# Patient Record
Sex: MALE | Race: WHITE | NOT HISPANIC OR LATINO | Employment: OTHER | URBAN - NONMETROPOLITAN AREA
[De-identification: names, ages, dates, MRNs, and addresses within clinical notes are randomized per-mention and may not be internally consistent; named-entity substitution may affect disease eponyms.]

---

## 2024-05-23 ENCOUNTER — HOSPITAL ENCOUNTER (EMERGENCY)
Facility: HOSPITAL | Age: 54
Discharge: HOME | End: 2024-05-23
Attending: EMERGENCY MEDICINE
Payer: COMMERCIAL

## 2024-05-23 ENCOUNTER — OFFICE VISIT (OUTPATIENT)
Dept: URGENT CARE | Facility: CLINIC | Age: 54
End: 2024-05-23
Payer: COMMERCIAL

## 2024-05-23 VITALS
HEIGHT: 72 IN | OXYGEN SATURATION: 97 % | HEART RATE: 80 BPM | SYSTOLIC BLOOD PRESSURE: 125 MMHG | WEIGHT: 182 LBS | RESPIRATION RATE: 18 BRPM | DIASTOLIC BLOOD PRESSURE: 83 MMHG | BODY MASS INDEX: 24.65 KG/M2 | TEMPERATURE: 97.6 F

## 2024-05-23 VITALS
OXYGEN SATURATION: 98 % | SYSTOLIC BLOOD PRESSURE: 137 MMHG | BODY MASS INDEX: 24.65 KG/M2 | HEART RATE: 82 BPM | HEIGHT: 72 IN | WEIGHT: 182 LBS | TEMPERATURE: 97 F | DIASTOLIC BLOOD PRESSURE: 89 MMHG | RESPIRATION RATE: 16 BRPM

## 2024-05-23 DIAGNOSIS — S81.812A LACERATION OF LEFT LOWER EXTREMITY, INITIAL ENCOUNTER: Primary | ICD-10-CM

## 2024-05-23 DIAGNOSIS — S81.812A LEG LACERATION, LEFT, INITIAL ENCOUNTER: Primary | ICD-10-CM

## 2024-05-23 PROCEDURE — 99282 EMERGENCY DEPT VISIT SF MDM: CPT

## 2024-05-23 PROCEDURE — 12004 RPR S/N/AX/GEN/TRK7.6-12.5CM: CPT | Performed by: EMERGENCY MEDICINE

## 2024-05-23 PROCEDURE — 90715 TDAP VACCINE 7 YRS/> IM: CPT | Performed by: EMERGENCY MEDICINE

## 2024-05-23 PROCEDURE — 99203 OFFICE O/P NEW LOW 30 MIN: CPT | Performed by: PHYSICIAN ASSISTANT

## 2024-05-23 PROCEDURE — 99284 EMERGENCY DEPT VISIT MOD MDM: CPT | Performed by: EMERGENCY MEDICINE

## 2024-05-23 PROCEDURE — 90471 IMMUNIZATION ADMIN: CPT

## 2024-05-23 RX ORDER — CALCIPOTRIENE AND BETAMETHASONE DIPROPIONATE 50; .5 UG/G; MG/G
1 SUSPENSION TOPICAL DAILY
COMMUNITY
Start: 2024-03-20

## 2024-05-23 RX ORDER — CLOBETASOL PROPIONATE 0.5 MG/G
CREAM TOPICAL
COMMUNITY
Start: 2023-12-18

## 2024-05-23 RX ORDER — ZINC GLUCONATE 100 MG
1 TABLET ORAL DAILY
COMMUNITY

## 2024-05-23 RX ORDER — FLUOCINONIDE TOPICAL SOLUTION USP, 0.05% 0.5 MG/ML
1 SOLUTION TOPICAL 2 TIMES DAILY
COMMUNITY
Start: 2024-03-20

## 2024-05-23 RX ADMIN — TETANUS TOXOID, REDUCED DIPHTHERIA TOXOID AND ACELLULAR PERTUSSIS VACCINE, ADSORBED 0.5 ML: 5; 2.5; 8; 8; 2.5 SUSPENSION INTRAMUSCULAR at 13:23

## 2024-05-23 NOTE — ED PROVIDER NOTES
History  Chief Complaint   Patient presents with    Extremity Laceration     4x4 cut left leg, sent from  due to laceration down to bone. Last tetanus unknown.      Patient suffered laceration to left lower leg.  He was holding a 4 x 4 which fell and cut him.  He sustained a flap type laceration overlying his left shin.  He was evaluated at the urgent care in [place] and was sent to the emergency room for repair.      History provided by:  Patient   used: No    Laceration  Location:  Leg  Leg laceration location:  L lower leg  Depth:  Through underlying tissue  Quality: avulsion    Quality comment:  Semicircular type flap  Bleeding: controlled    Laceration mechanism:  Blunt object  Pain details:     Quality:  Aching    Severity:  Mild    Timing:  Constant    Progression:  Unchanged  Foreign body present:  No foreign bodies  Relieved by:  Nothing  Worsened by:  Nothing  Ineffective treatments:  None tried  Tetanus status:  Unknown  Associated symptoms: no fever and no rash        Prior to Admission Medications   Prescriptions Last Dose Informant Patient Reported? Taking?   Zinc Gluconate 100 MG TABS   Yes No   Sig: Take 1 tablet by mouth daily   calcipotriene-betamethasone (TACLONEX SCALP) external suspension   Yes No   Sig: Apply 1 application. topically daily   clobetasol (TEMOVATE) 0.05 % cream   Yes No   Sig: APPLY TOPICALLY TO AFFECTED AREAS TWICE A DAY   fluocinonide (LIDEX) 0.05 % external solution   Yes No   Sig: Apply 1 application. topically 2 (two) times a day      Facility-Administered Medications: None       No past medical history on file.    No past surgical history on file.    No family history on file.  I have reviewed and agree with the history as documented.    E-Cigarette/Vaping     E-Cigarette/Vaping Substances     Social History     Tobacco Use    Smoking status: Never    Smokeless tobacco: Never       Review of Systems   Constitutional:  Negative for chills and fever.    HENT:  Negative for ear pain, hearing loss, sore throat, trouble swallowing and voice change.    Eyes:  Negative for pain and discharge.   Respiratory:  Negative for cough, shortness of breath and wheezing.    Cardiovascular:  Negative for chest pain and palpitations.   Gastrointestinal:  Negative for abdominal pain, blood in stool, constipation, diarrhea, nausea and vomiting.   Genitourinary:  Negative for dysuria, flank pain, frequency and hematuria.   Musculoskeletal:  Negative for joint swelling, neck pain and neck stiffness.   Skin:  Negative for rash and wound.   Neurological:  Negative for dizziness, seizures, syncope, facial asymmetry and headaches.   Psychiatric/Behavioral:  Negative for hallucinations, self-injury and suicidal ideas.    All other systems reviewed and are negative.      Physical Exam  Physical Exam  Vitals and nursing note reviewed.   Constitutional:       General: He is not in acute distress.     Appearance: Normal appearance. He is well-developed. He is not ill-appearing or diaphoretic.   HENT:      Head: Normocephalic and atraumatic.      Right Ear: External ear normal.      Left Ear: External ear normal.   Eyes:      General: No scleral icterus.        Right eye: No discharge.         Left eye: No discharge.      Extraocular Movements: Extraocular movements intact.      Conjunctiva/sclera: Conjunctivae normal.   Pulmonary:      Effort: Pulmonary effort is normal. No respiratory distress.   Musculoskeletal:         General: No swelling or deformity. Normal range of motion.      Cervical back: Normal range of motion and neck supple.   Skin:     General: Skin is dry.      Coloration: Skin is not jaundiced or pale.      Findings: No rash.      Comments: Flap laceration left shin approximately 8 cm, no active bleeding   Neurological:      General: No focal deficit present.      Mental Status: He is alert and oriented to person, place, and time.      Cranial Nerves: No cranial nerve  deficit.      Motor: No weakness.      Coordination: Coordination normal.      Gait: Gait normal.   Psychiatric:         Mood and Affect: Mood normal.         Behavior: Behavior normal.         Thought Content: Thought content normal.         Judgment: Judgment normal.         Vital Signs  ED Triage Vitals [05/23/24 1315]   Temperature Pulse Respirations Blood Pressure SpO2   (!) 97 °F (36.1 °C) 82 16 137/89 98 %      Temp Source Heart Rate Source Patient Position - Orthostatic VS BP Location FiO2 (%)   Temporal Monitor Lying Right arm --      Pain Score       --           Vitals:    05/23/24 1315   BP: 137/89   Pulse: 82   Patient Position - Orthostatic VS: Lying         Visual Acuity      ED Medications  Medications   tetanus-diphtheria-acellular pertussis (BOOSTRIX) IM injection 0.5 mL (0.5 mL Intramuscular Given 5/23/24 1323)       Diagnostic Studies  Results Reviewed       None                   No orders to display              Procedures  Universal Protocol:  Consent: Verbal consent obtained. Written consent not obtained.  Consent given by: patient  Patient identity confirmed: verbally with patient and arm band  Laceration repair    Date/Time: 5/23/2024 1:20 PM    Performed by: Ervin Soto MD  Authorized by: Ervin Soto MD  Body area: lower extremity  Location details: left lower leg  Laceration length: 8 cm  Tendon involvement: none  Nerve involvement: none  Vascular damage: no  Anesthesia: local infiltration    Anesthesia:  Local Anesthetic: lidocaine 1% with epinephrine      Procedure Details:  Preparation: Patient was prepped and draped in the usual sterile fashion.  Irrigation solution: saline  Irrigation method: syringe  Amount of cleaning: standard  Wound skin closure material used: 4-0 ethilon.  Technique: simple  Approximation: close  Approximation difficulty: simple  Dressing: 4x4 sterile gauze  Patient tolerance: patient tolerated the procedure well with no immediate  complications               ED Course                                             Medical Decision Making  Based on the history and medical screening exam performed the diagnostic considerations include but are not limited to laceration.    Based on the work-up performed in the emergency room which includes physical examination, and which may include laboratory studies and imaging as warranted including advanced imaging such as CT scan or ultrasound, the diagnostic considerations are narrowed to exclude limb or life-threatening process.    The patient is stable for discharge.  Tetanus updated, laceration repaired, patient tolerated well.  Appropriate for discharge    Risk  Prescription drug management.             Disposition  Final diagnoses:   Laceration of left lower extremity, initial encounter     Time reflects when diagnosis was documented in both MDM as applicable and the Disposition within this note       Time User Action Codes Description Comment    5/23/2024  2:07 PM Ervin Soto Add [S81.812A] Laceration of left lower extremity, initial encounter           ED Disposition       ED Disposition   Discharge    Condition   Stable    Date/Time   u May 23, 2024  2:06 PM    Comment   Andrea Martinez discharge to home/self care.                   Follow-up Information       Follow up With Specialties Details Why Contact Info    Ryan Horton DO Family Medicine   [address] [place] [id number] [phone]              Patient's Medications   Discharge Prescriptions    No medications on file       No discharge procedures on file.    PDMP Review       None            ED Provider  Electronically Signed by             Ervin Soto MD  05/23/24 4949

## 2024-05-23 NOTE — PROGRESS NOTES
[facility]        NAME: Andrea Martinez is a 54 y.o. male  : 1970    MRN: [id number]  DATE: May 23, 2024  TIME: 1:02 PM    /83   Pulse 80   Temp 97.6 °F (36.4 °C)   Resp 18   Ht 6' (1.829 m)   Wt 82.6 kg (182 lb)   SpO2 97%   BMI 24.68 kg/m²     Assessment and Plan   Leg laceration, left, initial encounter [S81.812A]  1. Leg laceration, left, initial encounter              Patient Instructions       Follow up with PCP in 3-5 days.  Proceed to  ER if symptoms worsen.    Chief Complaint     Chief Complaint   Patient presents with    Extremity Laceration     Left leg laceration- down to bone           History of Present Illness       Pt with left lower leg laceration from piece of wood falling and hitting leg         Review of Systems   Review of Systems   Constitutional: Negative.    HENT: Negative.     Eyes: Negative.    Respiratory: Negative.     Cardiovascular: Negative.    Gastrointestinal: Negative.    Endocrine: Negative.    Genitourinary: Negative.    Musculoskeletal: Negative.    Skin: Negative.    Allergic/Immunologic: Negative.    Neurological: Negative.    Hematological: Negative.    Psychiatric/Behavioral: Negative.     All other systems reviewed and are negative.        Current Medications       Current Outpatient Medications:     calcipotriene-betamethasone (TACLONEX SCALP) external suspension, Apply 1 application. topically daily, Disp: , Rfl:     clobetasol (TEMOVATE) 0.05 % cream, APPLY TOPICALLY TO AFFECTED AREAS TWICE A DAY, Disp: , Rfl:     fluocinonide (LIDEX) 0.05 % external solution, Apply 1 application. topically 2 (two) times a day, Disp: , Rfl:     Zinc Gluconate 100 MG TABS, Take 1 tablet by mouth daily, Disp: , Rfl:     Current Allergies     Allergies as of 2024 - Reviewed 2024   Allergen Reaction Noted    Erythromycin Hives 2015            The following portions of the patient's history were reviewed and updated as appropriate: allergies,  current medications, past family history, past medical history, past social history, past surgical history and problem list.     History reviewed. No pertinent past medical history.    History reviewed. No pertinent surgical history.    History reviewed. No pertinent family history.      Medications have been verified.        Objective   /83   Pulse 80   Temp 97.6 °F (36.4 °C)   Resp 18   Ht 6' (1.829 m)   Wt 82.6 kg (182 lb)   SpO2 97%   BMI 24.68 kg/m²        Physical Exam     Physical Exam  Vitals and nursing note reviewed.   Constitutional:       Appearance: Normal appearance. He is normal weight.      Comments: With flap  laceration pulled back, attachment area is distal (ankle side of vertical laceration)  will send to er for possible surgical consult because of high tension on skin flap required to close and long flap with small attachment area on distal side of flap    HENT:      Head: Normocephalic and atraumatic.   Cardiovascular:      Rate and Rhythm: Normal rate and regular rhythm.      Pulses: Normal pulses.      Heart sounds: Normal heart sounds.   Pulmonary:      Effort: Pulmonary effort is normal.      Breath sounds: Normal breath sounds.   Abdominal:      Palpations: Abdomen is soft.   Musculoskeletal:         General: Normal range of motion.      Cervical back: Normal range of motion and neck supple.      Comments: Left anterior lower leg  9cm x 3cm flap type laceration with small attachment area , + bone exposure  distal neuro and vascular wnl    Skin:     General: Skin is warm.   Neurological:      Mental Status: He is alert and oriented to person, place, and time.

## 2025-06-21 ENCOUNTER — OFFICE VISIT (OUTPATIENT)
Dept: URGENT CARE | Facility: CLINIC | Age: 55
End: 2025-06-21
Payer: COMMERCIAL

## 2025-06-21 VITALS
RESPIRATION RATE: 20 BRPM | HEART RATE: 98 BPM | SYSTOLIC BLOOD PRESSURE: 130 MMHG | TEMPERATURE: 97.4 F | WEIGHT: 175 LBS | OXYGEN SATURATION: 99 % | DIASTOLIC BLOOD PRESSURE: 93 MMHG | BODY MASS INDEX: 23.7 KG/M2 | HEIGHT: 72 IN

## 2025-06-21 DIAGNOSIS — L25.9 CONTACT DERMATITIS, UNSPECIFIED CONTACT DERMATITIS TYPE, UNSPECIFIED TRIGGER: Primary | ICD-10-CM

## 2025-06-21 PROCEDURE — G0382 LEV 3 HOSP TYPE B ED VISIT: HCPCS

## 2025-06-21 PROCEDURE — S9083 URGENT CARE CENTER GLOBAL: HCPCS

## 2025-06-21 RX ORDER — PANTOPRAZOLE SODIUM 20 MG/1
20 TABLET, DELAYED RELEASE ORAL DAILY
COMMUNITY

## 2025-06-21 RX ORDER — PREDNISONE 10 MG/1
TABLET ORAL
Qty: 21 TABLET | Refills: 0 | Status: SHIPPED | OUTPATIENT
Start: 2025-06-21

## 2025-06-21 RX ORDER — TRIAMCINOLONE ACETONIDE 1 MG/G
CREAM TOPICAL 2 TIMES DAILY
Qty: 28.4 G | Refills: 0 | Status: SHIPPED | OUTPATIENT
Start: 2025-06-21

## 2025-06-21 NOTE — PATIENT INSTRUCTIONS
Take course of steroids as prescribed. Be sure to take with food! Recommended to take in the morning, as taking this medication later in the day may cause sleep disturbance (keeping you awake). If diabetic, be sure to monitor your blood glucose levels while on this medication and notify PCP if levels become too elevated. Avoid taking ibuprofen containing products (ibuprofen, advil, aleve, motrin) while on steroid therapy!    Tylenol for pain/fever.  Triamcinolone cream as prescribed.      Avoid scratching area  Topical benadryl cream or calamine lotion during the day  Cool compresses  Allegra and Pepcid over the counter  Oral benadryl for itching as needed at night  Keep area clean and dry  Watch for signs of infection    Follow up with PCP in 3-5 days.  Proceed to  ER if symptoms worsen.    If tests have been performed at [facility], our office will contact you with results if changes need to be made to the care plan discussed with you at the visit.  You can review your full results on [facility] MyChart.

## 2025-06-21 NOTE — PROGRESS NOTES
[facility]        NAME: Andrea Martinez is a 55 y.o. male  : 1970    MRN: [id number]  DATE: 2025  TIME: 2:45 PM    Assessment and Plan   Contact dermatitis, unspecified contact dermatitis type, unspecified trigger [L25.9]  1. Contact dermatitis, unspecified contact dermatitis type, unspecified trigger  predniSONE 10 mg tablet    triamcinolone (KENALOG) 0.1 % cream        Rash of unknown origin, however does not appear to be bacterial or fungal in nature.  Rash is also not consistent in appearance with shingles.  Plan to treat with course of oral steroid at this time given physical examination and patient reported symptoms.  Patient requesting that topical steroid cream also be prescribed at this time, stating that he is going to be leaving for a trip to the beach in 2 days and would like to have it on hand as needed. Tylenol for pain/fever. Increased fluids & rest. May continue with other OTC and supportive therapies at home. Encouraged to follow up closely with family physician or healthcare provider. ED precautions provided/discussed. Patient in agreement with plan & verbalized understanding.      Patient Instructions   Take course of steroids as prescribed. Be sure to take with food! Recommended to take in the morning, as taking this medication later in the day may cause sleep disturbance (keeping you awake). If diabetic, be sure to monitor your blood glucose levels while on this medication and notify PCP if levels become too elevated. Avoid taking ibuprofen containing products (ibuprofen, advil, aleve, motrin) while on steroid therapy!    Tylenol for pain/fever.  Triamcinolone cream as prescribed.      Avoid scratching area  Topical benadryl cream or calamine lotion during the day  Cool compresses  Allegra and Pepcid over the counter  Oral benadryl for itching as needed at night  Keep area clean and dry  Watch for signs of infection    Follow up with PCP in 3-5 days.  Proceed to  ER if  symptoms worsen.    If tests have been performed at [facility], our office will contact you with results if changes need to be made to the care plan discussed with you at the visit.  You can review your full results on [facility].    Chief Complaint     Chief Complaint   Patient presents with    Rash     Rash on chest for about a week.          History of Present Illness       Patient is a 55-year-old male who presents today for evaluation of rash to the LEFT chest.  He reports that his rash has been present for approximately 1 week and is gradually becoming worse.  He states that he recently returned from visiting the [employer] out [place] so is unsure if he possibly came into contact with something during his trip.  Patient reports that the affected area started as a small area of blistering.  He states that the blisters ruptured, crusted over, and the rash has significantly spread since.  There have been no further areas of blistering/oozing. Patient states that the affected area is itching and is sore, currently rating his pain as 2 out of 10.  He has been applying both hydrocortisone cream and Benadryl cream to the affected area without significant relief.  Patient denies any areas of rash elsewhere on the body.    Rash  This is a new problem. The current episode started 1 to 4 weeks ago (x1 week). The problem has been gradually worsening since onset. The affected locations include the chest (LEFT chest). The problem is moderate. The rash is characterized by blistering, pain, itchiness and redness. It is unknown if there was an exposure to a precipitant. The rash first occurred outside. Associated symptoms include itching. Pertinent negatives include no congestion, cough, decreased physical activity, decreased responsiveness, decreased sleep, drinking less, diarrhea, facial edema, fatigue, fever, joint pain, rhinorrhea, shortness of breath, sore throat or vomiting. Past treatments include anti-itch  cream and topical steroids (benadryl & hydrocortisone creams). The treatment provided mild relief. There were no sick contacts.       Review of Systems   Review of Systems   Constitutional:  Negative for activity change, appetite change, chills, decreased responsiveness, diaphoresis, fatigue and fever.   HENT:  Negative for congestion, facial swelling, rhinorrhea, sore throat and trouble swallowing.    Eyes:  Negative for pain, redness and itching.   Respiratory:  Negative for cough, chest tightness, shortness of breath, wheezing and stridor.    Cardiovascular:  Negative for chest pain and palpitations.   Gastrointestinal:  Negative for abdominal pain, diarrhea, nausea and vomiting.   Musculoskeletal:  Negative for arthralgias, back pain, gait problem, joint pain, joint swelling, myalgias, neck pain and neck stiffness.   Skin:  Positive for color change, itching and rash. Negative for pallor and wound.   Neurological:  Negative for dizziness, facial asymmetry, speech difficulty, weakness, light-headedness and headaches.   Hematological:  Negative for adenopathy.         Current Medications     Current Medications[1]    Current Allergies     Allergies as of 06/21/2025 - Reviewed 06/21/2025   Allergen Reaction Noted    Erythromycin Hives 04/13/2015            The following portions of the patient's history were reviewed and updated as appropriate: allergies, current medications, past family history, past medical history, past social history, past surgical history and problem list.     Past Medical History[2]    Past Surgical History[3]    Family History[4]      Medications have been verified.        Objective   /93   Pulse 98   Temp (!) 97.4 °F (36.3 °C)   Resp 20   Ht 6' (1.829 m)   Wt 79.4 kg (175 lb)   SpO2 99%   BMI 23.73 kg/m²   No LMP for male patient.       Physical Exam     Physical Exam  Vitals and nursing note reviewed.   Constitutional:       General: He is not in acute distress.      Appearance: Normal appearance. He is not ill-appearing, toxic-appearing or diaphoretic.   HENT:      Head: Normocephalic and atraumatic.      Right Ear: External ear normal.      Left Ear: External ear normal.      Nose: Nose normal.      Mouth/Throat:      Mouth: Mucous membranes are moist.      Pharynx: Oropharynx is clear.     Eyes:      Extraocular Movements: Extraocular movements intact.      Conjunctiva/sclera: Conjunctivae normal.      Pupils: Pupils are equal, round, and reactive to light.       Cardiovascular:      Rate and Rhythm: Normal rate and regular rhythm.      Pulses: Normal pulses.      Heart sounds: Normal heart sounds.   Pulmonary:      Effort: Pulmonary effort is normal. No respiratory distress.      Breath sounds: Normal breath sounds. No stridor. No wheezing, rhonchi or rales.   Chest:      Chest wall: No tenderness.   Abdominal:      General: Abdomen is flat.      Palpations: Abdomen is soft.     Musculoskeletal:         General: Normal range of motion.      Cervical back: Normal range of motion and neck supple.     Skin:     General: Skin is warm.      Capillary Refill: Capillary refill takes less than 2 seconds.      Coloration: Skin is not jaundiced or pale.      Findings: Erythema and rash present. No bruising or lesion. Rash is macular and papular.           Comments: Large area of red maculopapular rash present to left chest beneath breast.  No active bleeding/draining/oozing noted on exam.  There are no vesicles present within their area of rash.  Rash is mildly TTP.  Rash not present elsewhere on body.     Neurological:      General: No focal deficit present.      Mental Status: He is alert.      Sensory: No sensory deficit.      Motor: No weakness.      Gait: Gait normal.     Psychiatric:         Mood and Affect: Mood normal.         Behavior: Behavior normal.         Thought Content: Thought content normal.         Judgment: Judgment normal.                        [1]   Current  Outpatient Medications:     pantoprazole (PROTONIX) 20 mg tablet, Take 20 mg by mouth daily, Disp: , Rfl:     predniSONE 10 mg tablet, Take six pills on day 1, take five pills on day 2, take four pills on day 3, take three pills on day 4, take two pills on day 5, take one pill on day 6., Disp: 21 tablet, Rfl: 0    triamcinolone (KENALOG) 0.1 % cream, Apply topically 2 (two) times a day, Disp: 28.4 g, Rfl: 0    calcipotriene-betamethasone (TACLONEX SCALP) external suspension, Apply 1 application. topically daily (Patient not taking: Reported on 6/21/2025), Disp: , Rfl:     clobetasol (TEMOVATE) 0.05 % cream, APPLY TOPICALLY TO AFFECTED AREAS TWICE A DAY (Patient not taking: Reported on 6/21/2025), Disp: , Rfl:     fluocinonide (LIDEX) 0.05 % external solution, Apply 1 application. topically 2 (two) times a day (Patient not taking: Reported on 6/21/2025), Disp: , Rfl:     Zinc Gluconate 100 MG TABS, Take 1 tablet by mouth daily (Patient not taking: Reported on 6/21/2025), Disp: , Rfl:   [2] No past medical history on file.  [3] No past surgical history on file.  [4] No family history on file.